# Patient Record
Sex: FEMALE
[De-identification: names, ages, dates, MRNs, and addresses within clinical notes are randomized per-mention and may not be internally consistent; named-entity substitution may affect disease eponyms.]

---

## 2024-03-21 ENCOUNTER — APPOINTMENT (OUTPATIENT)
Dept: NEUROSURGERY | Facility: HOSPITAL | Age: 29
End: 2024-03-21

## 2024-03-27 PROBLEM — Z00.00 ENCOUNTER FOR PREVENTIVE HEALTH EXAMINATION: Status: ACTIVE | Noted: 2024-03-27

## 2024-04-08 ENCOUNTER — APPOINTMENT (OUTPATIENT)
Dept: NEUROSURGERY | Facility: CLINIC | Age: 29
End: 2024-04-08
Payer: MEDICAID

## 2024-04-08 VITALS
BODY MASS INDEX: 27.47 KG/M2 | OXYGEN SATURATION: 98 % | DIASTOLIC BLOOD PRESSURE: 90 MMHG | HEIGHT: 67 IN | RESPIRATION RATE: 18 BRPM | SYSTOLIC BLOOD PRESSURE: 139 MMHG | HEART RATE: 91 BPM | WEIGHT: 175 LBS | TEMPERATURE: 97.5 F

## 2024-04-08 DIAGNOSIS — G50.1 ATYPICAL FACIAL PAIN: ICD-10-CM

## 2024-04-08 PROCEDURE — 99205 OFFICE O/P NEW HI 60 MIN: CPT

## 2024-04-08 NOTE — ASSESSMENT
[FreeTextEntry1] : 28 year old female with atypical left sided jaw pain referred by Dr. White.  MRI from 4/1/24 reviewed by Dr. Randhawa with patient showing vascular loop on the left trigeminal nerve and some crowding near the cistern.  Discussed surgical interventions for trigeminal neuralgia likely will not help her type of pain. Recommend seeing oral surgeon to further evaluate, provided information for Dr. Jason Darden I, Dr. Randhawa, personally performed the evaluation and management (E/M) services for this new patient.  That E/M includes conducting the initial examination, assessing all conditions, and establishing the plan of care.  Today, my ACP, Faith Lima, was here to observe my evaluation and management services for this patient to be followed going forward.   Patient and patient's family verbalize agreement and understanding with plan of care.

## 2024-04-08 NOTE — PHYSICAL EXAM
[General Appearance - Alert] : alert [General Appearance - In No Acute Distress] : in no acute distress [Oriented To Time, Place, And Person] : oriented to person, place, and time [Sclera] : the sclera and conjunctiva were normal [Extraocular Movements] : extraocular movements were intact [Outer Ear] : the ears and nose were normal in appearance [Neck Appearance] : the appearance of the neck was normal [] : no respiratory distress [Abnormal Walk] : normal gait [Skin Color & Pigmentation] : normal skin color and pigmentation

## 2024-04-08 NOTE — HISTORY OF PRESENT ILLNESS
[de-identified] : 28 year old female presents with left sided lower jaw pain that started after a dental procedure. She reports she visited the dentist 3-4 years ago for a cleaning and fillings and pain started after that. She was referred to a [facility] specialist who did 3D imaging of jaw which per patient did not show anything abnormal. She was then referred to Dr. White for continued pain. She was prescribed two medications, patient unsure the names, but ultimately failed medication management and is no longer on them. She reports the pain is deep in the gum, not facial pain.